# Patient Record
Sex: FEMALE | Race: OTHER | NOT HISPANIC OR LATINO
[De-identification: names, ages, dates, MRNs, and addresses within clinical notes are randomized per-mention and may not be internally consistent; named-entity substitution may affect disease eponyms.]

---

## 2023-08-13 ENCOUNTER — TRANSCRIPTION ENCOUNTER (OUTPATIENT)
Age: 18
End: 2023-08-13

## 2023-08-14 ENCOUNTER — EMERGENCY (EMERGENCY)
Facility: HOSPITAL | Age: 18
LOS: 1 days | Discharge: HOME | End: 2023-08-14
Attending: EMERGENCY MEDICINE | Admitting: EMERGENCY MEDICINE
Payer: COMMERCIAL

## 2023-08-14 VITALS
OXYGEN SATURATION: 99 % | WEIGHT: 95.02 LBS | RESPIRATION RATE: 16 BRPM | TEMPERATURE: 99 F | HEART RATE: 82 BPM | SYSTOLIC BLOOD PRESSURE: 111 MMHG | DIASTOLIC BLOOD PRESSURE: 73 MMHG

## 2023-08-14 DIAGNOSIS — R55 SYNCOPE AND COLLAPSE: ICD-10-CM
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
## 2023-08-14 DIAGNOSIS — S01.81XA LACERATION WITHOUT FOREIGN BODY OF OTHER PART OF HEAD, INITIAL ENCOUNTER: ICD-10-CM
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
## 2023-08-14 LAB
ALBUMIN SERPL ELPH-MCNC: 4.4 G/DL — SIGNIFICANT CHANGE UP (ref 3.4–5)
ALP SERPL-CCNC: 42 U/L — SIGNIFICANT CHANGE UP (ref 40–120)
ALT FLD-CCNC: 14 U/L — SIGNIFICANT CHANGE UP (ref 12–42)
ANION GAP SERPL CALC-SCNC: 11 MMOL/L — SIGNIFICANT CHANGE UP (ref 9–16)
AST SERPL-CCNC: 16 U/L — SIGNIFICANT CHANGE UP (ref 15–37)
BILIRUB SERPL-MCNC: 0.3 MG/DL — SIGNIFICANT CHANGE UP (ref 0.2–1.2)
BUN SERPL-MCNC: 9 MG/DL — SIGNIFICANT CHANGE UP (ref 7–23)
CALCIUM SERPL-MCNC: 9.6 MG/DL — SIGNIFICANT CHANGE UP (ref 8.5–10.5)
CHLORIDE SERPL-SCNC: 102 MMOL/L — SIGNIFICANT CHANGE UP (ref 96–108)
CO2 SERPL-SCNC: 26 MMOL/L — SIGNIFICANT CHANGE UP (ref 22–31)
CREAT SERPL-MCNC: 0.61 MG/DL — SIGNIFICANT CHANGE UP (ref 0.5–1.3)
D DIMER BLD IA.RAPID-MCNC: <187 NG/ML DDU — SIGNIFICANT CHANGE UP
EGFR: 132 ML/MIN/1.73M2 — SIGNIFICANT CHANGE UP
GLUCOSE SERPL-MCNC: 97 MG/DL — SIGNIFICANT CHANGE UP (ref 70–99)
HCG SERPL-ACNC: 1 MIU/ML — SIGNIFICANT CHANGE UP
HCT VFR BLD CALC: 39.5 % — SIGNIFICANT CHANGE UP (ref 34.5–45)
HGB BLD-MCNC: 13.2 G/DL — SIGNIFICANT CHANGE UP (ref 11.5–15.5)
MAGNESIUM SERPL-MCNC: 2.1 MG/DL — SIGNIFICANT CHANGE UP (ref 1.6–2.6)
MCHC RBC-ENTMCNC: 29.9 PG — SIGNIFICANT CHANGE UP (ref 27–34)
MCHC RBC-ENTMCNC: 33.4 GM/DL — SIGNIFICANT CHANGE UP (ref 32–36)
MCV RBC AUTO: 89.4 FL — SIGNIFICANT CHANGE UP (ref 80–100)
NRBC # BLD: 0 /100 WBCS — SIGNIFICANT CHANGE UP (ref 0–0)
PHOSPHATE SERPL-MCNC: 4 MG/DL — SIGNIFICANT CHANGE UP (ref 2.5–4.5)
PLATELET # BLD AUTO: 304 K/UL — SIGNIFICANT CHANGE UP (ref 150–400)
POTASSIUM SERPL-MCNC: 3.9 MMOL/L — SIGNIFICANT CHANGE UP (ref 3.5–5.3)
POTASSIUM SERPL-SCNC: 3.9 MMOL/L — SIGNIFICANT CHANGE UP (ref 3.5–5.3)
PROT SERPL-MCNC: 8 G/DL — SIGNIFICANT CHANGE UP (ref 6.4–8.2)
RBC # BLD: 4.42 M/UL — SIGNIFICANT CHANGE UP (ref 3.8–5.2)
RBC # FLD: 12.5 % — SIGNIFICANT CHANGE UP (ref 10.3–14.5)
SODIUM SERPL-SCNC: 139 MMOL/L — SIGNIFICANT CHANGE UP (ref 132–145)
TROPONIN I, HIGH SENSITIVITY RESULT: <4 NG/L — SIGNIFICANT CHANGE UP
WBC # BLD: 9.26 K/UL — SIGNIFICANT CHANGE UP (ref 3.8–10.5)
WBC # FLD AUTO: 9.26 K/UL — SIGNIFICANT CHANGE UP (ref 3.8–10.5)

## 2023-08-14 PROCEDURE — 99285 EMERGENCY DEPT VISIT HI MDM: CPT

## 2023-08-14 PROCEDURE — 70450 CT HEAD/BRAIN W/O DYE: CPT | Mod: 26

## 2023-08-14 RX ORDER — LIDOCAINE AND PRILOCAINE CREAM 25; 25 MG/G; MG/G
1 CREAM TOPICAL ONCE
Refills: 0 | Status: COMPLETED | OUTPATIENT
Start: 2023-08-14 | End: 2023-08-14

## 2023-08-14 RX ADMIN — LIDOCAINE AND PRILOCAINE CREAM 1 APPLICATION(S): 25; 25 CREAM TOPICAL at 21:03

## 2023-08-14 NOTE — ED PROVIDER NOTE - PHYSICAL EXAMINATION
General: well developed, well nourished, no distress  Eye: bilateral: PERRL, EOMI  Ears, Nose, Throat: normal pharynx, TMs normal, membranes moist.  Neck: non-tender, full range of motion, supple.  Negative For: lymphadenopathy (R), lymphadenopathy (L)  Respiratory: CTAB.  Cardiovascular: S1-S2 normal, regular rate, regular rhythm.  Abdomen: normal bowel sounds, non tender, soft.    Genitourinary: Negative For: CVA tenderness  Musculoskeletal: normal gait.  Negative For: back pain, upper, back pain  Extremities: normal range of motion, non-tender.  Negative For: edema (R), edema (L), calf tenderness (R), calf tenderness (L), swelling  Extremity Strength: upper extremities equal bilateral: 5/5, lower extremities equal bilateral: 5/5  Neurologic: alert, oriented to person, oriented to place, oriented to time.    Skin: normal color.  Negative For: rash  Psychiatric: normal affect, normal insight, normal concentration    1 cm linear laceration appreciated under the chin.  Head is normocephalic. Pt is neurologically intact, no focal neurologic deficits. GCS = 15. no raccoon eyes/winters signs/hemotympanum noted. No focal tenderness on spinous processes of C-spine.

## 2023-08-14 NOTE — ED PROVIDER NOTE - PROGRESS NOTE DETAILS
Signed out from Dr. Matos to f/up labs and CT imaging. Lac repaired during day shift. Labs are wnl and unremarkable for acute/emergent findings. CT pending. Patient sitting comfortably in room, no acute distress  CT head is unremarkable  Plastic surgeon, Dr. Holloway repaired laceration and instructed patient to follow-up in his outpatient clinic in 2 days.  Results to the patient's father.  Patient's father understands and agrees with plan.  Agree to follow-up with primary care doctor in 2 to 3 days.

## 2023-08-14 NOTE — ED PROVIDER NOTE - ATTENDING APP SHARED VISIT CONTRIBUTION OF CARE
17-year-old female with syncope, chin laceration repaired by plastics Dr. Luevano.  We will do medical work-up in the ED, EKG with normal sinus rhythm 72 bpm.  No acute ischemia.  Signed out to Dr. Guzman follow-up labs and final dispo.

## 2023-08-14 NOTE — ED PROVIDER NOTE - NS ED ATTENDING STATEMENT MOD
This was a shared visit with the TERA. I reviewed and verified the documentation and independently performed the documented:

## 2023-08-14 NOTE — ED PROVIDER NOTE - CONDITION AT DISCHARGE:
Contd meds , diet , daily x's , all reports noted with pt, will follow up Dr. Allen ,, FUWOD 's , had all shots , tcb x 1wk has stopped asa, contd k dur   Satisfactory

## 2023-08-14 NOTE — ED PROVIDER NOTE - CLINICAL SUMMARY MEDICAL DECISION MAKING FREE TEXT BOX
17-year-old female presents this emergency department for syncopal episode, laceration under the chin  Vital signs are stable and physical exam is unremarkable  Patient is neurologically intact  Labs, troponin, D-dimer, EKG, CT head ordered  We will continue to monitor patient

## 2023-08-14 NOTE — ED PROVIDER NOTE - PATIENT PORTAL LINK FT
You can access the FollowMyHealth Patient Portal offered by [facility] by registering at the following website: [email or web address]. By joining [facility]’s FollowMyHealth portal, you will also be able to view your health information using other applications (apps) compatible with our system.

## 2023-08-14 NOTE — ED ADULT NURSE NOTE - OBJECTIVE STATEMENT
s/p syncopal episode - father at bedside stated she was laying by the pool and after coming into the house - fainted at home. presents with laceration to chin. hx asthma.

## 2023-08-14 NOTE — ED PROVIDER NOTE - CARE PROVIDER_API CALL
Julieta Luevano [facility]  Plastic Surgery  [address] [place] [id number]  Phone: [phone]  Fax: [phone]  Follow Up Time:

## 2023-08-14 NOTE — ED ADULT NURSE NOTE - NSFALLUNIVINTERV_ED_ALL_ED
Bed/Stretcher in lowest position, wheels locked, appropriate side rails in place/Call bell, personal items and telephone in reach/Instruct patient to call for assistance before getting out of bed/chair/stretcher/Non-slip footwear applied when patient is off stretcher/[place] to call system/Physically safe environment - no spills, clutter or unnecessary equipment/Purposeful proactive rounding/Room/bathroom lighting operational, light cord in reach

## 2023-08-14 NOTE — ED PROVIDER NOTE - OBJECTIVE STATEMENT
18 yo F presents to this ED for synocpe and laceration of chin 18 yo F presents to this ED for syncope and laceration of chin that occurred 1 hour ago. Patient states that she got her on menses currently, and was sitting by the pool when she lost consciousness.  Patient presents with her father, who did not witness the event.  Patient states that "I must of been out for couple seconds".  Patient denies any head injury,   Memory loss, vomiting.  States that she has had similar episodes in the past, usually during her menses.  Has not followed up with primary care doctor regarding this.  Denies any palpitations, chest pain, shortness of breath, abdominal pain, nausea, vomiting, diarrhea, constipation, fevers, chills.  States that she hit her chin against the ground causing a laceration.

## 2024-02-07 PROBLEM — Z78.9 OTHER SPECIFIED HEALTH STATUS: Chronic | Status: ACTIVE | Noted: 2023-08-14

## 2024-02-08 ENCOUNTER — APPOINTMENT (OUTPATIENT)
Dept: OTOLARYNGOLOGY | Facility: CLINIC | Age: 19
End: 2024-02-08
Payer: COMMERCIAL

## 2024-02-08 DIAGNOSIS — J35.1 HYPERTROPHY OF TONSILS: ICD-10-CM

## 2024-02-08 DIAGNOSIS — J03.90 ACUTE TONSILLITIS, UNSPECIFIED: ICD-10-CM

## 2024-02-08 PROBLEM — Z00.00 ENCOUNTER FOR PREVENTIVE HEALTH EXAMINATION: Status: ACTIVE | Noted: 2024-02-08

## 2024-02-08 PROCEDURE — 99204 OFFICE O/P NEW MOD 45 MIN: CPT

## 2024-02-08 NOTE — CONSULT LETTER
Needs office visit.    [Dear  ___] : Dear  [unfilled], [Courtesy Letter:] : I had the pleasure of seeing your patient, [unfilled], in my office today. [Consult Closing:] : Thank you very much for allowing me to participate in the care of this patient.  If you have any questions, please do not hesitate to contact me. [Sincerely,] : Sincerely, [FreeTextEntry3] : Adin Cortez MD Department of Otolaryngology - Head and Neck Surgery

## 2024-02-08 NOTE — PHYSICAL EXAM
[FreeTextEntry1] : NAD, no stridor, voice strong [de-identified] : soft, flat, no masses/lesions [Midline] : trachea located in midline position [de-identified] : 3-4+, exophytic and lobular w exudated [de-identified] : posterior opx patent [Normal] : no rashes

## 2024-02-08 NOTE — HISTORY OF PRESENT ILLNESS
[de-identified] : 18F here for initial evaluation.  She c/o difficulty swallowing with severe throat pain. This started around 1 week ago and since then is >50% better. During this time she went to ED and also outpatient office. She was started on augmentin and already feels much improved. She has h/o prior strep tonsillitis. Negative mono testing in ED. No fevers, no weight loss, no vomiting.  ROS otherwise unremarkable.

## 2024-02-08 NOTE — ASSESSMENT
[FreeTextEntry1] : 18F here for initial evaluation. She c/o difficulty swallowing with severe throat pain. This started around 1 week ago and since then is now >50% better. During this time she went to ED and also outpatient office. She was started on augmentin 1-2 days ago and already feels much improved. There is no difficulty breathing or talking. She has h/o prior strep tonsillitis. There was negative mono testing in ED. On exam, pt is in no distress with normal breathing and normal voice. Tonsils are 3-4+, exophytic and lobular w exudates. The rest of the head and neck exam is unremarkable. She has acute bilateral exudative tonsillitis with severe tonsillar hypertrophy. There is no stridor nor difficulty breathing on exam. For now, agree with continuing augmentin. She was given steroids but reports she never took them - I said not a bad idea if sx are not improving, but as long as they continue to do so, ok to hold off. RTO 4 weeks in followup. So long as acute sx resolved, can discuss proceeding w tonsillectomy.

## 2024-03-28 ENCOUNTER — APPOINTMENT (OUTPATIENT)
Dept: OTOLARYNGOLOGY | Facility: CLINIC | Age: 19
End: 2024-03-28